# Patient Record
Sex: MALE | Race: WHITE | NOT HISPANIC OR LATINO | URBAN - METROPOLITAN AREA
[De-identification: names, ages, dates, MRNs, and addresses within clinical notes are randomized per-mention and may not be internally consistent; named-entity substitution may affect disease eponyms.]

---

## 2021-01-21 ENCOUNTER — OFFICE VISIT (OUTPATIENT)
Dept: URBAN - METROPOLITAN AREA CLINIC 78 | Facility: CLINIC | Age: 66
End: 2021-01-21
Payer: MEDICARE

## 2021-01-21 VITALS
HEART RATE: 76 BPM | SYSTOLIC BLOOD PRESSURE: 158 MMHG | TEMPERATURE: 97.8 F | WEIGHT: 165.4 LBS | RESPIRATION RATE: 16 BRPM | HEIGHT: 65 IN | DIASTOLIC BLOOD PRESSURE: 96 MMHG | BODY MASS INDEX: 27.56 KG/M2

## 2021-01-21 DIAGNOSIS — Z86.010 PERSONAL HISTORY OF COLON POLYPS: ICD-10-CM

## 2021-01-21 DIAGNOSIS — K21.9 REFLUX: ICD-10-CM

## 2021-01-21 DIAGNOSIS — R12 HEARTBURN: ICD-10-CM

## 2021-01-21 DIAGNOSIS — K43.9 VENTRAL HERNIA: ICD-10-CM

## 2021-01-21 DIAGNOSIS — K57.90 DIVERTICULOSIS COLI: ICD-10-CM

## 2021-01-21 PROCEDURE — 99214 OFFICE O/P EST MOD 30 MIN: CPT | Performed by: INTERNAL MEDICINE

## 2021-01-21 PROCEDURE — G8483 FLU IMM NO ADMIN DOC REA: HCPCS | Performed by: INTERNAL MEDICINE

## 2021-01-21 RX ORDER — SODIUM, POTASSIUM,MAG SULFATES 17.5-3.13G
177 ML DAY 1 AND 177 ML DAY 2 SOLUTION, RECONSTITUTED, ORAL ORAL
Qty: 354 MILLILITER | Refills: 0 | OUTPATIENT
Start: 2021-01-21

## 2021-01-21 RX ORDER — DIPHENHYDRAMINE HYDROCHLORIDE 25 MG/1
TABLET, FILM COATED ORAL
Qty: 0 | Refills: 0 | Status: ON HOLD | COMMUNITY
Start: 1900-01-01

## 2021-01-21 NOTE — HPI-TODAY'S VISIT:
Patient is reporting some pain or discomfort in left lower quadrant  BM are irregular  Reports flatulence worse with fiber  Spicy food made his symptoms worse Food make it better, drinks lot of coffee ( 4-5 cups in morning )  Pain is discomfot 1/10 non radiating  Does not work out but walks the dog ..describes himself as active . Last colonoscopy was in 2012 .  Admits to diverticulosis  Reports fatigues at 2 pm  PCP did stool test in feb 2020  Denies rectal bleeding  CT scan was last year ..nothing except herniating  Reportedly labs and UA was negative PCP Personal hx of hernia surgery   Heartburn x many years  Had EGD in 2012  He has been on Prilosec 20 mg   Of note , pt seen for similar complains in 2018 but did not schedule the procedures

## 2021-01-21 NOTE — PHYSICAL EXAM GASTROINTESTINAL
Abdomen , soft,  reducible ventral hernia , rectus abdominus diastasis ,nondistended , no guarding or rigidity , no masses palpable , normal bowel sounds , Liver and Spleen , no hepatomegaly present , liver nontender , spleen not palpable

## 2021-03-05 ENCOUNTER — CLAIMS CREATED FROM THE CLAIM WINDOW (OUTPATIENT)
Dept: URBAN - METROPOLITAN AREA CLINIC 4 | Facility: CLINIC | Age: 66
End: 2021-03-05
Payer: MEDICARE

## 2021-03-05 ENCOUNTER — OFFICE VISIT (OUTPATIENT)
Dept: URBAN - METROPOLITAN AREA SURGERY CENTER 15 | Facility: SURGERY CENTER | Age: 66
End: 2021-03-05
Payer: MEDICARE

## 2021-03-05 DIAGNOSIS — K63.89 MASS OF HEPATIC FLEXURE OF COLON: ICD-10-CM

## 2021-03-05 DIAGNOSIS — Z86.010 H/O ADENOMATOUS POLYP OF COLON: ICD-10-CM

## 2021-03-05 DIAGNOSIS — K31.89 DILATION OF STOMACH: ICD-10-CM

## 2021-03-05 DIAGNOSIS — K22.2 ACQUIRED ESOPHAGEAL RING: ICD-10-CM

## 2021-03-05 DIAGNOSIS — K21.9 ACID REFLUX: ICD-10-CM

## 2021-03-05 DIAGNOSIS — K22.8 OTHER SPECIFIED DISEASES OF ESOPHAGUS: ICD-10-CM

## 2021-03-05 DIAGNOSIS — D12.4 ADENOMA OF DESCENDING COLON: ICD-10-CM

## 2021-03-05 DIAGNOSIS — D12.3 ADENOMA OF TRANSVERSE COLON: ICD-10-CM

## 2021-03-05 DIAGNOSIS — D12.4 BENIGN NEOPLASM OF DESCENDING COLON: ICD-10-CM

## 2021-03-05 DIAGNOSIS — D12.3 BENIGN NEOPLASM OF TRANSVERSE COLON: ICD-10-CM

## 2021-03-05 DIAGNOSIS — K63.5 BENIGN COLON POLYP: ICD-10-CM

## 2021-03-05 DIAGNOSIS — K22.4 CORKSCREW ESOPHAGUS: ICD-10-CM

## 2021-03-05 PROCEDURE — 88312 SPECIAL STAINS GROUP 1: CPT | Performed by: PATHOLOGY

## 2021-03-05 PROCEDURE — 88305 TISSUE EXAM BY PATHOLOGIST: CPT | Performed by: PATHOLOGY

## 2021-03-05 PROCEDURE — 45385 COLONOSCOPY W/LESION REMOVAL: CPT | Performed by: INTERNAL MEDICINE

## 2021-03-05 PROCEDURE — 45380 COLONOSCOPY AND BIOPSY: CPT | Performed by: INTERNAL MEDICINE

## 2021-03-05 PROCEDURE — 43249 ESOPH EGD DILATION <30 MM: CPT | Performed by: INTERNAL MEDICINE

## 2021-03-05 PROCEDURE — 43239 EGD BIOPSY SINGLE/MULTIPLE: CPT | Performed by: INTERNAL MEDICINE

## 2021-03-05 PROCEDURE — G8907 PT DOC NO EVENTS ON DISCHARG: HCPCS | Performed by: INTERNAL MEDICINE

## 2021-03-05 RX ORDER — SODIUM, POTASSIUM,MAG SULFATES 17.5-3.13G
177 ML DAY 1 AND 177 ML DAY 2 SOLUTION, RECONSTITUTED, ORAL ORAL
Qty: 354 MILLILITER | Refills: 0 | Status: ACTIVE | COMMUNITY
Start: 2021-01-21

## 2021-03-05 RX ORDER — DIPHENHYDRAMINE HYDROCHLORIDE 25 MG/1
TABLET, FILM COATED ORAL
Qty: 0 | Refills: 0 | Status: ON HOLD | COMMUNITY
Start: 1900-01-01

## 2021-04-05 ENCOUNTER — OFFICE VISIT (OUTPATIENT)
Dept: URBAN - METROPOLITAN AREA CLINIC 78 | Facility: CLINIC | Age: 66
End: 2021-04-05
Payer: MEDICARE

## 2021-04-05 VITALS
HEART RATE: 77 BPM | BODY MASS INDEX: 26.86 KG/M2 | HEIGHT: 65 IN | WEIGHT: 161.2 LBS | SYSTOLIC BLOOD PRESSURE: 152 MMHG | RESPIRATION RATE: 16 BRPM | DIASTOLIC BLOOD PRESSURE: 93 MMHG | TEMPERATURE: 99.8 F

## 2021-04-05 DIAGNOSIS — K57.90 DIVERTICULOSIS COLI: ICD-10-CM

## 2021-04-05 DIAGNOSIS — K22.4 ESOPHAGEAL DYSMOTILITY: ICD-10-CM

## 2021-04-05 DIAGNOSIS — K43.9 VENTRAL HERNIA: ICD-10-CM

## 2021-04-05 DIAGNOSIS — K21.9 REFLUX: ICD-10-CM

## 2021-04-05 DIAGNOSIS — R12 HEARTBURN: ICD-10-CM

## 2021-04-05 DIAGNOSIS — K22.2 LOWER ESOPHAGEAL RING (SCHATZKI): ICD-10-CM

## 2021-04-05 DIAGNOSIS — Z86.010 PERSONAL HISTORY OF COLON POLYPS: ICD-10-CM

## 2021-04-05 PROCEDURE — 99214 OFFICE O/P EST MOD 30 MIN: CPT | Performed by: INTERNAL MEDICINE

## 2021-04-05 RX ORDER — DIPHENHYDRAMINE HYDROCHLORIDE 25 MG/1
TABLET, FILM COATED ORAL
Qty: 0 | Refills: 0 | Status: ON HOLD | COMMUNITY
Start: 1900-01-01

## 2021-04-05 RX ORDER — SODIUM, POTASSIUM,MAG SULFATES 17.5-3.13G
177 ML DAY 1 AND 177 ML DAY 2 SOLUTION, RECONSTITUTED, ORAL ORAL
Qty: 354 MILLILITER | Refills: 0 | Status: ON HOLD | COMMUNITY
Start: 2021-01-21

## 2021-04-05 NOTE — HPI-TODAY'S VISIT:
Patient is here in a follow up after the recent EGD and a colonoscopy. The findings of the procedure and the pathology were discussed with the patient. All questions were answered to the patient's satisfaction.  EGD with Schatzki's ring s/p  dilation done  Mid esophageal bx are unremarkable  He takes Prilosec  He reports intermittent choking with liquids or if he eats fast   Colonoscopy with polypectomy . TA is 2 and HP    Miralax prn helps now with constipation  Is working on improving it   Night cap ..bourbon , whiskey at night        Patient is reporting some pain or discomfort in left lower quadrant  BM are irregular  Reports flatulence worse with fiber  Spicy food made his symptoms worse Food make it better, drinks lot of coffee ( 4-5 cups in morning )  Pain is discomfot 1/10 non radiating  Does not work out but walks the dog ..describes himself as active . Last colonoscopy was in 2012 .  Admits to diverticulosis  Reports fatigues at 2 pm  PCP did stool test in feb 2020  Denies rectal bleeding  CT scan was last year ..nothing except herniating  Reportedly labs and UA was negative PCP Personal hx of hernia surgery   Heartburn x many years  Had EGD in 2012  He has been on Prilosec 20 mg   Of note , pt seen for similar complains in 2018 but did not schedule the procedures

## 2021-04-07 ENCOUNTER — TELEPHONE ENCOUNTER (OUTPATIENT)
Dept: URBAN - METROPOLITAN AREA CLINIC 78 | Facility: CLINIC | Age: 66
End: 2021-04-07

## 2021-10-04 ENCOUNTER — OFFICE VISIT (OUTPATIENT)
Dept: URBAN - METROPOLITAN AREA CLINIC 78 | Facility: CLINIC | Age: 66
End: 2021-10-04
Payer: MEDICARE

## 2021-10-04 VITALS
HEART RATE: 67 BPM | WEIGHT: 159.4 LBS | SYSTOLIC BLOOD PRESSURE: 134 MMHG | DIASTOLIC BLOOD PRESSURE: 85 MMHG | TEMPERATURE: 97.6 F | HEIGHT: 65 IN | RESPIRATION RATE: 16 BRPM | BODY MASS INDEX: 26.56 KG/M2

## 2021-10-04 DIAGNOSIS — K22.2 LOWER ESOPHAGEAL RING (SCHATZKI): ICD-10-CM

## 2021-10-04 DIAGNOSIS — K21.9 REFLUX: ICD-10-CM

## 2021-10-04 DIAGNOSIS — K43.9 VENTRAL HERNIA: ICD-10-CM

## 2021-10-04 DIAGNOSIS — Z86.010 PERSONAL HISTORY OF COLON POLYPS: ICD-10-CM

## 2021-10-04 DIAGNOSIS — K22.4 ESOPHAGEAL DYSMOTILITY: ICD-10-CM

## 2021-10-04 DIAGNOSIS — K57.90 DIVERTICULOSIS COLI: ICD-10-CM

## 2021-10-04 DIAGNOSIS — R12 HEARTBURN: ICD-10-CM

## 2021-10-04 PROBLEM — 266434009: Status: ACTIVE | Noted: 2021-04-05

## 2021-10-04 PROBLEM — 235595009 GASTROESOPHAGEAL REFLUX DISEASE: Status: ACTIVE | Noted: 2021-01-21

## 2021-10-04 PROBLEM — 398050005 DIVERTICULAR DISEASE OF COLON: Status: ACTIVE | Noted: 2021-01-21

## 2021-10-04 PROBLEM — 235623002: Status: ACTIVE | Noted: 2021-04-05

## 2021-10-04 PROCEDURE — 99212 OFFICE O/P EST SF 10 MIN: CPT | Performed by: INTERNAL MEDICINE

## 2021-10-04 RX ORDER — DIPHENHYDRAMINE HYDROCHLORIDE 25 MG/1
TABLET, FILM COATED ORAL
Qty: 0 | Refills: 0 | Status: ON HOLD | COMMUNITY
Start: 1900-01-01

## 2021-10-04 RX ORDER — SODIUM, POTASSIUM,MAG SULFATES 17.5-3.13G
177 ML DAY 1 AND 177 ML DAY 2 SOLUTION, RECONSTITUTED, ORAL ORAL
Qty: 354 MILLILITER | Refills: 0 | Status: ON HOLD | COMMUNITY
Start: 2021-01-21

## 2021-10-04 NOTE — HPI-TODAY'S VISIT:
Patient is doing well  Uses Miralax prn for constipation  Swallowing is better , he does not have many episodes of things going in wrong pipe " no more choking episodes with liquids " Takes Prilosec daily OTC daily    EGD with Schatzki's ring s/p  dilation done  Mid esophageal bx are unremarkable  He takes Prilosec  He reports intermittent choking with liquids or if he eats fast   Colonoscopy with polypectomy . TA is 2 and HP    Miralax prn helps now with constipation  Is working on improving it   Night cap ..bourbon , whiskey at night    Denies rectal bleeding CT scan was last year ..nothing except herniating  Reportedly labs and UA was negative PCP Personal hx of hernia surgery

## 2023-08-28 ENCOUNTER — DASHBOARD ENCOUNTERS (OUTPATIENT)
Age: 68
End: 2023-08-28

## 2023-08-28 ENCOUNTER — OFFICE VISIT (OUTPATIENT)
Dept: URBAN - METROPOLITAN AREA CLINIC 78 | Facility: CLINIC | Age: 68
End: 2023-08-28
Payer: MEDICARE

## 2023-08-28 VITALS
SYSTOLIC BLOOD PRESSURE: 184 MMHG | WEIGHT: 157 LBS | HEIGHT: 65 IN | DIASTOLIC BLOOD PRESSURE: 100 MMHG | TEMPERATURE: 98.3 F | HEART RATE: 69 BPM | BODY MASS INDEX: 26.16 KG/M2 | RESPIRATION RATE: 15 BRPM

## 2023-08-28 DIAGNOSIS — K43.9 VENTRAL HERNIA: ICD-10-CM

## 2023-08-28 DIAGNOSIS — R10.32 LEFT LOWER QUADRANT PAIN: ICD-10-CM

## 2023-08-28 DIAGNOSIS — K57.90 DIVERTICULOSIS COLI: ICD-10-CM

## 2023-08-28 DIAGNOSIS — K21.9 REFLUX: ICD-10-CM

## 2023-08-28 DIAGNOSIS — K22.4 ESOPHAGEAL DYSMOTILITY: ICD-10-CM

## 2023-08-28 DIAGNOSIS — Z86.010 PERSONAL HISTORY OF COLON POLYPS: ICD-10-CM

## 2023-08-28 DIAGNOSIS — R93.5 ABNORMAL CT OF THE ABDOMEN: ICD-10-CM

## 2023-08-28 DIAGNOSIS — K22.2 LOWER ESOPHAGEAL RING (SCHATZKI): ICD-10-CM

## 2023-08-28 PROCEDURE — 99214 OFFICE O/P EST MOD 30 MIN: CPT | Performed by: INTERNAL MEDICINE

## 2023-08-28 RX ORDER — DIPHENHYDRAMINE HYDROCHLORIDE 25 MG/1
TABLET, FILM COATED ORAL
Qty: 0 | Refills: 0 | Status: ON HOLD | COMMUNITY
Start: 1900-01-01

## 2023-08-28 RX ORDER — SODIUM, POTASSIUM,MAG SULFATES 17.5-3.13G
177 ML DAY 1 AND 177 ML DAY 2 SOLUTION, RECONSTITUTED, ORAL ORAL
Qty: 354 MILLILITER | Refills: 0 | Status: ON HOLD | COMMUNITY
Start: 2021-01-21

## 2023-08-28 NOTE — HPI-TODAY'S VISIT:
The patient was referred to us by Dr. Drew Gamez for LLQ pain. A copy of this note will be sent to the referring physician.  He has been followed by  in the past.  In the early 2000s he had an inguinal hernia repair. He believes it was on the R side. He has lately noted pain in the L inguinal area.   Appetite has been good. Weight has been stable. He has been ranging between 147-150lbs. Takes Prilosec daily OTC daily.  Miralax QD has helped with constipation.   Denies rectal bleeding  He has white coat hypertension.   He has been more active lately. Besides playing golf he rides ~ 6 miles a day on a stationary bike. 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 He tries to limit what he lifts.  The patient last had a colonoscopy in 2021. There is no FH of esoph/gastric/colon cancer or colon polyps.   Summary of prior workup: -CT scan of the abdomen and pelvis 3/31/2023: Normal lung bases, liver, spleen, adrenal glands, pancreas, gallbladder and bile ducts.  Simple cyst in the upper pole of the right kidney measuring 3.5 cm.  Normal retroperitoneum.  Status post prostatectomy.  Normal urinary bladder.  No free fluid in the pelvis.  No concerning lymphadenopathy.  There were however haustral thickening in the descending colon compatible with focal colitis.  No bowel obstruction.  Omentum, mesentery and paracolic gutters were normal in appearance.  No intra-abdominal abscess. -EGD/Colonoscopy in 2021 by DR: TAs x 2 and HP x1. Schatzki's ring s/p  dilation to 20mm. Esoph biopsies unremearkable. Gastric erythema, no H pylori. Normal duo, no celiac.

## 2023-11-13 ENCOUNTER — OFFICE VISIT (OUTPATIENT)
Dept: URBAN - METROPOLITAN AREA CLINIC 78 | Facility: CLINIC | Age: 68
End: 2023-11-13

## 2024-04-09 ENCOUNTER — DAC (OUTPATIENT)
Dept: URBAN - METROPOLITAN AREA SURGERY CENTER 15 | Facility: SURGERY CENTER | Age: 69
End: 2024-04-09
Payer: MEDICARE

## 2024-04-09 ENCOUNTER — LAB (OUTPATIENT)
Dept: URBAN - METROPOLITAN AREA CLINIC 4 | Facility: CLINIC | Age: 69
End: 2024-04-09
Payer: MEDICARE

## 2024-04-09 DIAGNOSIS — Z86.010 ADENOMAS PERSONAL HISTORY OF COLONIC POLYPS: ICD-10-CM

## 2024-04-09 DIAGNOSIS — D12.2 ADENOMA OF ASCENDING COLON: ICD-10-CM

## 2024-04-09 DIAGNOSIS — D12.2 BENIGN NEOPLASM OF ASCENDING COLON: ICD-10-CM

## 2024-04-09 PROCEDURE — 45385 COLONOSCOPY W/LESION REMOVAL: CPT | Performed by: INTERNAL MEDICINE

## 2024-04-09 PROCEDURE — 88305 TISSUE EXAM BY PATHOLOGIST: CPT | Performed by: PATHOLOGY
